# Patient Record
Sex: FEMALE
[De-identification: names, ages, dates, MRNs, and addresses within clinical notes are randomized per-mention and may not be internally consistent; named-entity substitution may affect disease eponyms.]

---

## 2021-03-01 PROBLEM — Z00.00 ENCOUNTER FOR PREVENTIVE HEALTH EXAMINATION: Status: ACTIVE | Noted: 2021-03-01

## 2021-03-03 ENCOUNTER — NON-APPOINTMENT (OUTPATIENT)
Age: 61
End: 2021-03-03

## 2021-03-11 ENCOUNTER — NON-APPOINTMENT (OUTPATIENT)
Age: 61
End: 2021-03-11

## 2021-03-11 ENCOUNTER — APPOINTMENT (OUTPATIENT)
Dept: GYNECOLOGIC ONCOLOGY | Facility: CLINIC | Age: 61
End: 2021-03-11
Payer: COMMERCIAL

## 2021-03-11 VITALS
DIASTOLIC BLOOD PRESSURE: 88 MMHG | SYSTOLIC BLOOD PRESSURE: 129 MMHG | HEART RATE: 76 BPM | HEIGHT: 62 IN | WEIGHT: 161.13 LBS | BODY MASS INDEX: 29.65 KG/M2

## 2021-03-11 PROCEDURE — 99072 ADDL SUPL MATRL&STAF TM PHE: CPT

## 2021-03-11 PROCEDURE — 57420 EXAM OF VAGINA W/SCOPE: CPT

## 2021-03-11 PROCEDURE — 99204 OFFICE O/P NEW MOD 45 MIN: CPT | Mod: 25

## 2021-03-24 ENCOUNTER — OUTPATIENT (OUTPATIENT)
Dept: OUTPATIENT SERVICES | Facility: HOSPITAL | Age: 61
LOS: 1 days | End: 2021-03-24
Payer: COMMERCIAL

## 2021-03-24 VITALS
DIASTOLIC BLOOD PRESSURE: 70 MMHG | TEMPERATURE: 99 F | HEART RATE: 74 BPM | SYSTOLIC BLOOD PRESSURE: 120 MMHG | OXYGEN SATURATION: 98 % | WEIGHT: 162.04 LBS | RESPIRATION RATE: 16 BRPM | HEIGHT: 62 IN

## 2021-03-24 DIAGNOSIS — Z90.710 ACQUIRED ABSENCE OF BOTH CERVIX AND UTERUS: Chronic | ICD-10-CM

## 2021-03-24 DIAGNOSIS — N89.3 DYSPLASIA OF VAGINA, UNSPECIFIED: ICD-10-CM

## 2021-03-24 LAB
ANION GAP SERPL CALC-SCNC: 8 MMOL/L — SIGNIFICANT CHANGE UP (ref 7–14)
BUN SERPL-MCNC: 18 MG/DL — SIGNIFICANT CHANGE UP (ref 7–23)
CALCIUM SERPL-MCNC: 9.6 MG/DL — SIGNIFICANT CHANGE UP (ref 8.4–10.5)
CHLORIDE SERPL-SCNC: 106 MMOL/L — SIGNIFICANT CHANGE UP (ref 98–107)
CO2 SERPL-SCNC: 27 MMOL/L — SIGNIFICANT CHANGE UP (ref 22–31)
CREAT SERPL-MCNC: 0.55 MG/DL — SIGNIFICANT CHANGE UP (ref 0.5–1.3)
GLUCOSE SERPL-MCNC: 64 MG/DL — LOW (ref 70–99)
HCT VFR BLD CALC: 42.3 % — SIGNIFICANT CHANGE UP (ref 34.5–45)
HGB BLD-MCNC: 13.6 G/DL — SIGNIFICANT CHANGE UP (ref 11.5–15.5)
MCHC RBC-ENTMCNC: 29.4 PG — SIGNIFICANT CHANGE UP (ref 27–34)
MCHC RBC-ENTMCNC: 32.2 GM/DL — SIGNIFICANT CHANGE UP (ref 32–36)
MCV RBC AUTO: 91.6 FL — SIGNIFICANT CHANGE UP (ref 80–100)
NRBC # BLD: 0 /100 WBCS — SIGNIFICANT CHANGE UP
NRBC # FLD: 0 K/UL — SIGNIFICANT CHANGE UP
PLATELET # BLD AUTO: 262 K/UL — SIGNIFICANT CHANGE UP (ref 150–400)
POTASSIUM SERPL-MCNC: 4.1 MMOL/L — SIGNIFICANT CHANGE UP (ref 3.5–5.3)
POTASSIUM SERPL-SCNC: 4.1 MMOL/L — SIGNIFICANT CHANGE UP (ref 3.5–5.3)
RBC # BLD: 4.62 M/UL — SIGNIFICANT CHANGE UP (ref 3.8–5.2)
RBC # FLD: 12.2 % — SIGNIFICANT CHANGE UP (ref 10.3–14.5)
SODIUM SERPL-SCNC: 141 MMOL/L — SIGNIFICANT CHANGE UP (ref 135–145)
WBC # BLD: 5.78 K/UL — SIGNIFICANT CHANGE UP (ref 3.8–10.5)
WBC # FLD AUTO: 5.78 K/UL — SIGNIFICANT CHANGE UP (ref 3.8–10.5)

## 2021-03-24 PROCEDURE — 93010 ELECTROCARDIOGRAM REPORT: CPT

## 2021-03-24 RX ORDER — SODIUM CHLORIDE 9 MG/ML
1000 INJECTION, SOLUTION INTRAVENOUS
Refills: 0 | Status: DISCONTINUED | OUTPATIENT
Start: 2021-04-07 | End: 2021-04-21

## 2021-03-24 NOTE — H&P PST ADULT - RESPIRATORY RATE (BREATHS/MIN)
TRANSFER - OUT REPORT:    Verbal report given to Yolette Cerrato RN on Alena Ortega  being transferred to [id number] for routine progression of care       Report consisted of patients Situation, Background, Assessment and Recommendations(SBAR). Information from the following report(s) Procedure Summary was reviewed with the receiving nurse. Opportunity for questions and clarification was provided. 16

## 2021-03-24 NOTE — H&P PST ADULT - NSANTHOSAYNRD_GEN_A_CORE
No. LAYO screening performed.  STOP BANG Legend: 0-2 = LOW Risk; 3-4 = INTERMEDIATE Risk; 5-8 = HIGH Risk

## 2021-03-24 NOTE — H&P PST ADULT - NSICDXPROBLEM_GEN_ALL_CORE_FT
PROBLEM DIAGNOSES  Problem: Vaginal dysplasia  Assessment and Plan: Pt scheduled for surgery and preop instructions including instructions for taking Famotidine on the day of surgery, given verbally and with use of  written materials, and patient confirming understanding of such instructions using  teach back method.  Pt seen by PCP for [facility] - forms given - to be faxed to surgeon - request copy

## 2021-03-24 NOTE — H&P PST ADULT - NSICDXPASTMEDICALHX_GEN_ALL_CORE_FT
PAST MEDICAL HISTORY:  Obesity     Vaginal dysplasia      PAST MEDICAL HISTORY:  Glaucoma     Obesity     Vaginal dysplasia

## 2021-03-24 NOTE — H&P PST ADULT - HISTORY OF PRESENT ILLNESS
Pt is a 61 yr old female scheduled for Laser Ablation of Vagina with Dr Guthrie tentatively 4/7/21 - pt noted increased frequency of urination and vaginal lesion noted - pt denies pain or bleeding -   Pt denies COVID or recent travel   Pt to have COVID preop test  Pt is a 61 yr old female scheduled for Laser Ablation of Vagina with Dr Guthrie tentatively 4/7/21 - pt noted increased frequency of urination and vaginal lesion noted - pt denies pain or bleeding - Pt is Mandarin speaking   Pt denies COVID or recent travel   Pt to have COVID preop test  Pt is a 61 yr old female scheduled for Laser Ablation of Vagina with Dr Guthrie tentatively 4/7/21 - pt noted increased frequency of urination and was seen and treated by PCP and encouraged to see GYN for exam - vaginal lesion noted and biopsied and pt referred for surgical removal  - pt denies pain or bleeding at present - Pt is Mandarin speaking - Hx of hysterectomy for fibroid 2008   Pt denies COVID or recent travel   Pt to have COVID preop test

## 2021-03-24 NOTE — H&P PST ADULT - GENITOURINARY COMMENTS
Pt hx dysuria and frequency but treated by PCP 1 month ago successfully - pt denies symptoms at present

## 2021-03-29 ENCOUNTER — RESULT REVIEW (OUTPATIENT)
Age: 61
End: 2021-03-29

## 2021-03-29 ENCOUNTER — OUTPATIENT (OUTPATIENT)
Dept: OUTPATIENT SERVICES | Facility: HOSPITAL | Age: 61
LOS: 1 days | End: 2021-03-29
Payer: COMMERCIAL

## 2021-03-29 DIAGNOSIS — Z90.710 ACQUIRED ABSENCE OF BOTH CERVIX AND UTERUS: Chronic | ICD-10-CM

## 2021-03-29 DIAGNOSIS — C80.1 MALIGNANT (PRIMARY) NEOPLASM, UNSPECIFIED: ICD-10-CM

## 2021-03-29 PROCEDURE — 88321 CONSLTJ&REPRT SLD PREP ELSWR: CPT

## 2021-03-29 PROCEDURE — 57061 DESTRUCTION VAG LESIONS SMPL: CPT

## 2021-03-31 LAB — SURGICAL PATHOLOGY STUDY: SIGNIFICANT CHANGE UP

## 2021-04-03 DIAGNOSIS — Z01.818 ENCOUNTER FOR OTHER PREPROCEDURAL EXAMINATION: ICD-10-CM

## 2021-04-04 ENCOUNTER — APPOINTMENT (OUTPATIENT)
Dept: DISASTER EMERGENCY | Facility: CLINIC | Age: 61
End: 2021-04-04

## 2021-04-04 LAB — SARS-COV-2 N GENE NPH QL NAA+PROBE: NOT DETECTED

## 2021-04-06 ENCOUNTER — TRANSCRIPTION ENCOUNTER (OUTPATIENT)
Age: 61
End: 2021-04-06

## 2021-04-06 NOTE — ASU PATIENT PROFILE, ADULT - PRESSURE ULCER(S)
Per verbal order from Mallika Tapia to prescribe venlafaxine 37.5 mg tablets to take 2 capsules by mouth daily in the morning with breakfast for depression and anxiety.    Script sent to patients pharmacy   no

## 2021-04-07 ENCOUNTER — OUTPATIENT (OUTPATIENT)
Dept: OUTPATIENT SERVICES | Facility: HOSPITAL | Age: 61
LOS: 1 days | Discharge: HOME | End: 2021-04-07
Payer: COMMERCIAL

## 2021-04-07 ENCOUNTER — APPOINTMENT (OUTPATIENT)
Dept: GYNECOLOGIC ONCOLOGY | Facility: HOSPITAL | Age: 61
End: 2021-04-07

## 2021-04-07 VITALS
SYSTOLIC BLOOD PRESSURE: 121 MMHG | HEART RATE: 61 BPM | OXYGEN SATURATION: 100 % | DIASTOLIC BLOOD PRESSURE: 67 MMHG | RESPIRATION RATE: 16 BRPM

## 2021-04-07 VITALS
HEART RATE: 70 BPM | RESPIRATION RATE: 18 BRPM | DIASTOLIC BLOOD PRESSURE: 80 MMHG | OXYGEN SATURATION: 97 % | TEMPERATURE: 99 F | SYSTOLIC BLOOD PRESSURE: 122 MMHG | WEIGHT: 162.04 LBS | HEIGHT: 62 IN

## 2021-04-07 DIAGNOSIS — N89.3 DYSPLASIA OF VAGINA, UNSPECIFIED: ICD-10-CM

## 2021-04-07 DIAGNOSIS — Z90.710 ACQUIRED ABSENCE OF BOTH CERVIX AND UTERUS: Chronic | ICD-10-CM

## 2021-04-07 PROCEDURE — 57061 DESTRUCTION VAG LESIONS SMPL: CPT

## 2021-04-07 RX ORDER — METOCLOPRAMIDE HCL 10 MG
10 TABLET ORAL ONCE
Refills: 0 | Status: DISCONTINUED | OUTPATIENT
Start: 2021-04-07 | End: 2021-04-21

## 2021-04-07 RX ORDER — FENTANYL CITRATE 50 UG/ML
25 INJECTION INTRAVENOUS
Refills: 0 | Status: DISCONTINUED | OUTPATIENT
Start: 2021-04-07 | End: 2021-04-07

## 2021-04-07 RX ORDER — OXYCODONE HYDROCHLORIDE 5 MG/1
5 TABLET ORAL ONCE
Refills: 0 | Status: DISCONTINUED | OUTPATIENT
Start: 2021-04-07 | End: 2021-04-07

## 2021-04-07 RX ORDER — FENTANYL CITRATE 50 UG/ML
50 INJECTION INTRAVENOUS
Refills: 0 | Status: DISCONTINUED | OUTPATIENT
Start: 2021-04-07 | End: 2021-04-07

## 2021-04-07 RX ORDER — ONDANSETRON 8 MG/1
4 TABLET, FILM COATED ORAL ONCE
Refills: 0 | Status: DISCONTINUED | OUTPATIENT
Start: 2021-04-07 | End: 2021-04-21

## 2021-04-07 NOTE — ASU DISCHARGE PLAN (ADULT/PEDIATRIC) - NURSING INSTRUCTIONS
You were given intravenous TYLENOL for pain management at 07:45 AM. Please DO NOT take any products containing TYLENOL or ACETAMINOPHEN, such as VICODIN, PERCOCET, EXCEDRIN, and any over-the-counter cold medication for the next 6 hours until 1:45 PM. DO NOT TAKE MORE THAN 3000 MG OF TYLENOL in a 24 hour period.     You were given intravenous TORADOL for pain management at 07:45 AM. Please DO NOT take any products containing MOTRIN, IBUPROFEN, or ADVIL until 1:45 PM.

## 2021-04-07 NOTE — ASU DISCHARGE PLAN (ADULT/PEDIATRIC) - CARE PROVIDER_API CALL
Yana Guthrie)  Gynecologic Oncology; Obstetrics and Gynecology  [address] [place] [address]  Phone: [phone]  Fax: [phone]  Established Patient  Scheduled Appointment: 04/29/2021 02:30 PM

## 2021-04-07 NOTE — ASU DISCHARGE PLAN (ADULT/PEDIATRIC) - PROVIDER TOKENS
Transition Communication Hand-off for Care Transitions to Next Level of Care Provider    Name: Betty Villasenor  MRN #: [id number]  Primary Care Provider: Aisha Charles  Primary Clinic: [address] [address]    [facility]emat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 She has had multiple admissions and extensive workup for febrile neutropenia, weakness, deconditioning, general malaise, failure to thrive. She was most recently discharged on 9/12. She had fever and weakness at home that night and again on 9/13. She was instructed to go back to the ED and subsequently admitted on 9/13/2017 for workup and management. Fevers now resolved, suspect they were secondary to progressive lymphoma. Therefore, started DA EPOCH 9/15 and tolerating well so far.    Discharge Plan:  Discharged to [facility]. Will follow up with Dr. Amaya tomorrow and plans to return home to [place] after appointments.     Follow-up plan:  Future Appointments  Date Time Provider Department Center   9/20/2017 10:30 AM  [facility]   9/20/2017 11:00 AM Dustin Amaya MD [facility]   9/20/2017 5:00 PM Nurse, Uc Oncology Injection [facility]   9/20/2017 6:00 PM Lindsay Perkins MD [facility]   9/22/2017 10:15 AM  [facility]   9/22/2017 11:00 AM Bia Pak PA-C [facility]       Radha De Oliveira, RNCC  Phone [phone]  Pager [phone]    AVS/Discharge Summary is the source of truth; this is a helpful guide for improved communication of patient story           PROVIDER:[TOKEN:[[id number]],SCHEDULEDAPPT:[04/29/2021],SCHEDULEDAPPTTIME:[02:30 PM],ESTABLISHEDPATIENT:[T]]

## 2021-04-12 ENCOUNTER — NON-APPOINTMENT (OUTPATIENT)
Age: 61
End: 2021-04-12

## 2021-04-21 ENCOUNTER — NON-APPOINTMENT (OUTPATIENT)
Age: 61
End: 2021-04-21

## 2021-04-29 ENCOUNTER — APPOINTMENT (OUTPATIENT)
Dept: GYNECOLOGIC ONCOLOGY | Facility: CLINIC | Age: 61
End: 2021-04-29
Payer: COMMERCIAL

## 2021-04-29 VITALS
BODY MASS INDEX: 29.67 KG/M2 | HEART RATE: 90 BPM | DIASTOLIC BLOOD PRESSURE: 79 MMHG | HEIGHT: 62 IN | WEIGHT: 161.25 LBS | TEMPERATURE: 97.9 F | SYSTOLIC BLOOD PRESSURE: 137 MMHG

## 2021-04-29 PROCEDURE — 99024 POSTOP FOLLOW-UP VISIT: CPT

## 2021-08-22 ENCOUNTER — APPOINTMENT (OUTPATIENT)
Dept: DISASTER EMERGENCY | Facility: CLINIC | Age: 61
End: 2021-08-22

## 2021-08-23 LAB — SARS-COV-2 N GENE NPH QL NAA+PROBE: NOT DETECTED

## 2021-10-29 ENCOUNTER — APPOINTMENT (OUTPATIENT)
Dept: GYNECOLOGIC ONCOLOGY | Facility: CLINIC | Age: 61
End: 2021-10-29
Payer: COMMERCIAL

## 2021-10-29 VITALS
SYSTOLIC BLOOD PRESSURE: 128 MMHG | HEART RATE: 87 BPM | DIASTOLIC BLOOD PRESSURE: 79 MMHG | HEIGHT: 62 IN | BODY MASS INDEX: 29.63 KG/M2 | WEIGHT: 161 LBS

## 2021-10-29 PROBLEM — E66.9 OBESITY, UNSPECIFIED: Chronic | Status: ACTIVE | Noted: 2021-03-24

## 2021-10-29 PROBLEM — H40.9 UNSPECIFIED GLAUCOMA: Chronic | Status: ACTIVE | Noted: 2021-03-24

## 2021-10-29 PROBLEM — N89.3 DYSPLASIA OF VAGINA, UNSPECIFIED: Chronic | Status: ACTIVE | Noted: 2021-03-24

## 2021-10-29 PROCEDURE — 57420 EXAM OF VAGINA W/SCOPE: CPT

## 2021-10-29 PROCEDURE — 99213 OFFICE O/P EST LOW 20 MIN: CPT | Mod: 25

## 2021-10-29 NOTE — HISTORY OF PRESENT ILLNESS
[FreeTextEntry1] : Vaginal dysplasia\par Last seen for postop 4/29/2021 s/p Laser ablation of upper vagina, known hx of VAIN 3.\par Hx of hysterectomy for a fibroid uterus\par \par \par Denies abdominal/pelvic pain, dyspnea or chest pain, vaginal bleeding or discharge, nausea/vomiting, changes in bowel habits, lower extremity edema or pain.\par  Reports recent dysuria.  She saw her PMD yesterday and had urine culture.  Has follow up next week to discuss results.\par \par \par

## 2021-11-01 LAB — HPV HIGH+LOW RISK DNA PNL CVX: NOT DETECTED

## 2021-11-07 ENCOUNTER — APPOINTMENT (OUTPATIENT)
Dept: DISASTER EMERGENCY | Facility: CLINIC | Age: 61
End: 2021-11-07

## 2021-11-08 LAB — SARS-COV-2 N GENE NPH QL NAA+PROBE: NOT DETECTED

## 2021-11-12 LAB — CYTOLOGY CVX/VAG DOC THIN PREP: ABNORMAL

## 2022-10-28 ENCOUNTER — APPOINTMENT (OUTPATIENT)
Dept: GYNECOLOGIC ONCOLOGY | Facility: CLINIC | Age: 62
End: 2022-10-28

## 2022-10-28 VITALS
SYSTOLIC BLOOD PRESSURE: 138 MMHG | BODY MASS INDEX: 30 KG/M2 | HEIGHT: 62 IN | WEIGHT: 163 LBS | DIASTOLIC BLOOD PRESSURE: 88 MMHG | HEART RATE: 80 BPM

## 2022-10-28 DIAGNOSIS — R35.0 FREQUENCY OF MICTURITION: ICD-10-CM

## 2022-10-28 DIAGNOSIS — N89.3 DYSPLASIA OF VAGINA, UNSPECIFIED: ICD-10-CM

## 2022-10-28 PROCEDURE — 99214 OFFICE O/P EST MOD 30 MIN: CPT

## 2022-10-28 NOTE — HISTORY OF PRESENT ILLNESS
[FreeTextEntry1] : s/p LORETA in 2008\par VAIN3 on vaginal biopsy 2/2021\par Laser ablation of upper vagina 4/7/2021\par \par Last seen 10/2021\par \par Vaginal pap in 10/2021 negative, HPV negative\par \par She reports occasional urinary frequency.  Urination is associated with pelvic cramping.  Denies vaginal bleeding, nausea/vomiting, changes in bowel habits\par \par

## 2022-10-28 NOTE — PHYSICAL EXAM
[Chaperone Present] : A chaperone was present in the examining room during all aspects of the physical examination [Absent] : Adnexa(ae): Absent [Normal] : Recto-Vaginal Exam: Normal

## 2022-10-31 LAB — HPV HIGH+LOW RISK DNA PNL CVX: NOT DETECTED

## 2022-11-09 LAB — CYTOLOGY CVX/VAG DOC THIN PREP: NORMAL

## 2023-08-15 NOTE — H&P PST ADULT - DENTITION
Quality 431: Preventive Care And Screening: Unhealthy Alcohol Use - Screening: Patient not identified as an unhealthy alcohol user when screened for unhealthy alcohol use using a systematic screening method Quality 226: Preventive Care And Screening: Tobacco Use: Screening And Cessation Intervention: Patient screened for tobacco use, is a smoker AND received Cessation Counseling within measurement period or in the six months prior to the measurement period Detail Level: Zone Quality 130: Documentation Of Current Medications In The Medical Record: Current Medications Documented denies loose teeth - partial denture

## 2023-12-18 ENCOUNTER — APPOINTMENT (OUTPATIENT)
Dept: GYNECOLOGIC ONCOLOGY | Facility: CLINIC | Age: 63
End: 2023-12-18
Payer: COMMERCIAL

## 2023-12-18 VITALS
DIASTOLIC BLOOD PRESSURE: 77 MMHG | BODY MASS INDEX: 28.21 KG/M2 | WEIGHT: 154.25 LBS | SYSTOLIC BLOOD PRESSURE: 117 MMHG | HEART RATE: 80 BPM

## 2023-12-18 PROCEDURE — 99214 OFFICE O/P EST MOD 30 MIN: CPT

## 2023-12-18 NOTE — DISCUSSION/SUMMARY
[FreeTextEntry1] : Signs and symptoms of recurrence reviewed in detail, namely vaginal bleeding, abdominal pain, changes in bowel habits or urination, nausea/vomiting. F/u in 12 months or sooner prn Plan for imaging as clinically indicated

## 2023-12-18 NOTE — HISTORY OF PRESENT ILLNESS
[FreeTextEntry1] : s/p LORETA in 2008 VAIN3 on vaginal biopsy 2/2021 Laser ablation of upper vagina 4/7/2021  Last seen 10/2022  Vaginal pap in 10/2022 negative, HPV negative  She reports occasional urinary frequency.  Urination is associated with pelvic cramping.  Denies vaginal bleeding, nausea/vomiting, changes in bowel habits

## 2023-12-21 LAB — CYTOLOGY CVX/VAG DOC THIN PREP: NORMAL

## 2024-12-16 ENCOUNTER — APPOINTMENT (OUTPATIENT)
Dept: GYNECOLOGIC ONCOLOGY | Facility: CLINIC | Age: 64
End: 2024-12-16
Payer: MEDICARE

## 2024-12-16 ENCOUNTER — NON-APPOINTMENT (OUTPATIENT)
Age: 64
End: 2024-12-16

## 2024-12-16 VITALS
BODY MASS INDEX: 28.34 KG/M2 | OXYGEN SATURATION: 97 % | TEMPERATURE: 98.4 F | HEART RATE: 95 BPM | DIASTOLIC BLOOD PRESSURE: 76 MMHG | WEIGHT: 154 LBS | SYSTOLIC BLOOD PRESSURE: 117 MMHG | HEIGHT: 62 IN

## 2024-12-16 DIAGNOSIS — N89.3 DYSPLASIA OF VAGINA, UNSPECIFIED: ICD-10-CM

## 2024-12-16 PROCEDURE — 99204 OFFICE O/P NEW MOD 45 MIN: CPT

## 2024-12-18 LAB — HPV HIGH+LOW RISK DNA PNL CVX: NOT DETECTED

## 2024-12-23 LAB — CYTOLOGY CVX/VAG DOC THIN PREP: NORMAL
